# Patient Record
Sex: FEMALE | Race: BLACK OR AFRICAN AMERICAN | NOT HISPANIC OR LATINO | Employment: OTHER | RURAL
[De-identification: names, ages, dates, MRNs, and addresses within clinical notes are randomized per-mention and may not be internally consistent; named-entity substitution may affect disease eponyms.]

---

## 2021-12-09 ENCOUNTER — IMMUNIZATION (OUTPATIENT)
Dept: FAMILY MEDICINE | Facility: CLINIC | Age: 86
End: 2021-12-09
Payer: MEDICARE

## 2021-12-09 DIAGNOSIS — Z23 NEED FOR VACCINATION: Primary | ICD-10-CM

## 2021-12-09 PROCEDURE — 91306 COVID-19, MRNA, LNP-S, PF, 100 MCG/0.25 ML DOSE VACCINE (MODERNA BOOSTER): ICD-10-PCS | Mod: ,,, | Performed by: FAMILY MEDICINE

## 2021-12-09 PROCEDURE — 0064A COVID-19, MRNA, LNP-S, PF, 100 MCG/0.25 ML DOSE VACCINE (MODERNA BOOSTER): CPT | Mod: ,,, | Performed by: FAMILY MEDICINE

## 2021-12-09 PROCEDURE — 91306 COVID-19, MRNA, LNP-S, PF, 100 MCG/0.25 ML DOSE VACCINE (MODERNA BOOSTER): CPT | Mod: ,,, | Performed by: FAMILY MEDICINE

## 2021-12-09 PROCEDURE — 0064A COVID-19, MRNA, LNP-S, PF, 100 MCG/0.25 ML DOSE VACCINE (MODERNA BOOSTER): ICD-10-PCS | Mod: ,,, | Performed by: FAMILY MEDICINE

## 2024-09-04 DIAGNOSIS — M54.2 NECK PAIN ON LEFT SIDE: Primary | ICD-10-CM

## 2024-09-11 DIAGNOSIS — M54.12 CERVICAL RADICULOPATHY: Primary | ICD-10-CM

## 2024-09-27 ENCOUNTER — HOSPITAL ENCOUNTER (OUTPATIENT)
Dept: RADIOLOGY | Facility: HOSPITAL | Age: 89
Discharge: HOME | End: 2024-09-27
Attending: ORTHOPAEDIC SURGERY
Payer: MEDICARE

## 2024-09-27 ENCOUNTER — OFFICE VISIT (OUTPATIENT)
Dept: SPINE | Facility: CLINIC | Age: 89
End: 2024-09-27
Payer: MEDICARE

## 2024-09-27 DIAGNOSIS — M47.22 CERVICAL SPONDYLOSIS WITH RADICULOPATHY: Primary | ICD-10-CM

## 2024-09-27 DIAGNOSIS — M54.12 CERVICAL RADICULOPATHY: ICD-10-CM

## 2024-09-27 DIAGNOSIS — M54.2 NECK PAIN ON LEFT SIDE: ICD-10-CM

## 2024-09-27 PROCEDURE — 1159F MED LIST DOCD IN RCRD: CPT | Mod: CPTII,,, | Performed by: ORTHOPAEDIC SURGERY

## 2024-09-27 PROCEDURE — 72050 X-RAY EXAM NECK SPINE 4/5VWS: CPT | Mod: 26,,, | Performed by: ORTHOPAEDIC SURGERY

## 2024-09-27 PROCEDURE — 99214 OFFICE O/P EST MOD 30 MIN: CPT | Mod: PBBFAC,25 | Performed by: ORTHOPAEDIC SURGERY

## 2024-09-27 PROCEDURE — 72050 X-RAY EXAM NECK SPINE 4/5VWS: CPT | Mod: TC

## 2024-09-27 PROCEDURE — 99999 PR PBB SHADOW E&M-EST. PATIENT-LVL IV: CPT | Mod: PBBFAC,,, | Performed by: ORTHOPAEDIC SURGERY

## 2024-09-27 PROCEDURE — 99204 OFFICE O/P NEW MOD 45 MIN: CPT | Mod: S$PBB,,, | Performed by: ORTHOPAEDIC SURGERY

## 2024-09-27 RX ORDER — ESOMEPRAZOLE MAGNESIUM 40 MG/1
40 CAPSULE, DELAYED RELEASE ORAL
COMMUNITY

## 2024-09-27 RX ORDER — HYDROCHLOROTHIAZIDE 25 MG/1
25 TABLET ORAL
COMMUNITY
Start: 2024-07-28

## 2024-09-27 RX ORDER — ENALAPRIL MALEATE 20 MG/1
20 TABLET ORAL 2 TIMES DAILY
COMMUNITY
Start: 2024-07-09

## 2024-09-27 RX ORDER — GABAPENTIN 100 MG/1
100 CAPSULE ORAL
COMMUNITY
Start: 2024-08-23

## 2024-09-27 RX ORDER — ATORVASTATIN CALCIUM 40 MG/1
40 TABLET, FILM COATED ORAL
COMMUNITY
Start: 2024-02-27

## 2024-09-27 RX ORDER — CYCLOBENZAPRINE HCL 5 MG
5 TABLET ORAL
COMMUNITY
Start: 2024-08-23

## 2024-09-27 RX ORDER — MAGNESIUM 64 MG (MAGNESIUM CHLORIDE) TABLET,DELAYED RELEASE
64
COMMUNITY
Start: 2024-06-14

## 2024-09-27 RX ORDER — ASPIRIN 325 MG
50000 TABLET, DELAYED RELEASE (ENTERIC COATED) ORAL
COMMUNITY
Start: 2024-06-14

## 2024-09-27 RX ORDER — NAPROXEN SODIUM 220 MG/1
81 TABLET, FILM COATED ORAL
COMMUNITY

## 2024-09-27 RX ORDER — LORATADINE 10 MG/1
10 TABLET ORAL
COMMUNITY
Start: 2024-06-14

## 2024-09-27 RX ORDER — DOXAZOSIN 1 MG/1
1 TABLET ORAL
COMMUNITY
Start: 2024-08-24

## 2024-09-27 NOTE — PROGRESS NOTES
MDM/time:  45-50 minutes spent on this encounter including 15 minutes reviewing imaging and notes, 20 minutes with the patient, 10 minutes documentation    ASSESSMENT:  89 y.o. female with cervical spondylolisthesis at C7-T1    PLAN:  PT cervical spine   Follow up 4 months     HPI:  89 y.o. female here for evaluation of neck radiates into the left side of the neck no radicular pain in the arms or hands.  She reports May 20, 2024 she woke up and had a Crick in her neck in the left side and has had difficulty turning her head to the left since that time.  She denies numbness tingling in her arms or hands.  Denies difficulty with  strength.  Balance issues but no falls.  Denies bladder bowel incontinence.  No difficulty walking distances.  Currently taking Tylenol and muscle relaxers as needed for pain.  No recent physical therapy.  No prior pain management.  No recent MRI.  No prior spine surgery.  Patient is not a smoker.  Patient has had a CT scan of her cervical spine [facility]    IMAGING:  AP, lateral, flexion/extension views of the cervical spine reviewed     On the AP there is normal coronal alignment.  There is uncovertebral and facet hypertrophy seen.  On the lateral there is loss of cervical lordosis.  There are spondylotic changes noted with decrease in disc height and osteophyte formation seen.  Grade 1 anterolisthesis at C7-T1.     Impression:  Degenerative changes of cervical spine as noted above    No past medical history on file.  No past surgical history on file.      No current outpatient medications     EXAM:  Constitutional  General Appearance:  There is no height or weight on file to calculate BMI., NAD  Psychiatric   Orientation: Oriented to time, oriented to place, oriented to person  Mood and Affect: Active and alert, normal mood, normal affect  Gait and Station   Appearance:  antalgic gait, normal tandem gait, able to walk on toes, able to walk on  heels    CERVICAL  Musculoskeletal System  Shoulder:  normal appearance, no instability, no tenderness, normal ROM right, normal ROM left, no pain with ROM    Cervical Spine  Inspection:  alignment normal, no muscle atrophy  Soft Tissue Palpation on the Right:  no tenderness of the paracervicals, no tenderness of the trapezius, no tenderness of the rhomboid  Soft Tissue Palpation on the Left:  no tenderness of the paracervicals, no tenderness of the trapezius, no tenderness of the rhomboid  Bony Palpation:  no tenderness of the occipital protuberance  Active Range:     Flexion decreased, Extension decreased, Rotation to the left decreased, Rotation to the right decreased, Lateral flexion to the left decreased, Lateral flexion to the right decreased   Pain elicited on motion    Motor Strength  C5 on the right:  abduction deltoid 4/5  C5 on the left:  abduction deltoid 4/5  C6 on the Right:  flexion biceps 5/5, wrist extension 5/5  C6 on the Left:  flexion biceps 5/5, wrist extension 5/5  C7 on the Right:  extension fingers 5/5, extension triceps 5/5  C7 on the Left:  extension fingers 5/5, extension triceps 5/5  C8 on the Right:  flexion fingers 5/5  C8 on the Left:  flexion fingers 5/5  T1 on the Right:  abduction fingers 5/5  T1 on the Left:  abduction fingers 5/5    Neurological System  Sensation on the Right:  normal sensation of the extremities: right, normal median nerve distribution, normal ulnar nerve distribution  Sensation on the Left:  normal sensation of the extremities: left, normal median nerve distribution, normal ulnar nerve distribution  Biceps Reflex Right:  normal, Brachioradialis Reflex Right:  normal, Triceps Reflex Right: normal  Biceps Reflex Left:  normal, Brachioradialis Reflex Left: normal, Triceps Reflex Left:  normal  Special Test on the Right:  Spurlings test negative, Hoffmans reflex absent, no ankle clonus, Durkan test negative, Tinels sign negative at the elbow  Special Test on the  Left:  Spurlings test negative, Hoffmans reflex absent, no ankle clonus, Durkan test negative, Tinels sign negative at the elbow    Skin:   Head and Neck:  normal   Right Upper Extremity:  normal   Left Upper Extremity:  normal    Cardiovascular:   Arterial Pulses Right:  radial right   Arterial Pulses Left:  radial left   Edema Right:  none   Edema Left:  none

## 2024-10-22 ENCOUNTER — CLINICAL SUPPORT (OUTPATIENT)
Dept: REHABILITATION | Facility: HOSPITAL | Age: 89
End: 2024-10-22
Payer: MEDICARE

## 2024-10-22 DIAGNOSIS — M47.22 CERVICAL SPONDYLOSIS WITH RADICULOPATHY: ICD-10-CM

## 2024-10-22 PROCEDURE — 97162 PT EVAL MOD COMPLEX 30 MIN: CPT

## 2024-10-22 NOTE — PATIENT INSTRUCTIONS
Chin Tuck    Lying flat on back, keeping head in contact with bed or floor, tuck your chin without lifting your head. Hold and repeat. Repeat 10 Times   Hold 5 Seconds   Complete 2 Sets   Perform 2 Times a Day          UPPER TRAP STRETCH        - Stand upright with good posture, one arm behind the back  - Use your free hand to grab the top of your head  - Gently pull your head to the side (ear to shoulder)  - Hold the stretch and try to relax your muscles with deep breathing     Repeat 5 Times   Hold 20 Seconds   Perform 2 Times a Day          CERVICAL ROTATION WITH OVER PRESSURE STRETCH    Turn your head to one side and then use your opposite side hand to add over-pressure on your jaw for a deeper stretch.    Hold, relax and repeat.    Video # [id number] Repeat 10 Times   Hold 10 Seconds   Complete 1 Set   Perform 1 Times a Day          ELASTIC BAND ROWS    Tie the middle section of an elastic band in a knot and place it at elbow height on the other side of a door and shut the door on it.    Hold the elastic band with both hands and then pull the bands back as you allow your elbows to bend near the side of your body. Squeeze your shoulder blades down and together.    Return to starting position and repeat.    Video # [id number] Repeat 10 Times   Hold 1 Second   Complete 2 Sets   Perform 1 Times a Day          Corner Stretch    Stand upright in the corner of a wall, place hands on each wall in corner, stretch chest    Hold for 30 seconds for 3 sets Repeat 3 Times   Hold 30 Seconds   Complete 3 Sets

## 2024-10-22 NOTE — PLAN OF CARE
[facility]  Physical Therapy Initial Evaluation    Name: Randi Scanlon  Clinic Number: [id number]    Therapy Diagnosis:   Encounter Diagnosis   Name Primary?    Cervical spondylosis with radiculopathy        Physician: Magaly Spencer, NP    Physician Orders: PT Eval and Treat   Medical Diagnosis from Referral: M47.22 (ICD-10-CM) - Cervical spondylosis with radiculopathy  Evaluation Date: 10/22/2024  Authorization Period Expiration: Initial evaluation- Tracy  Plan of Care Expiration: 12/13/24  Visit # / Visits authorized: 1/ Only initial evaluation approved by [employer]. Subsequent visits require prior authorization.      Time In: 1056  Time Out: 1128  Total Appointment Time (timed & untimed codes): 32 minutes    Precautions: Standard, Diabetes, and heart palpitations, hx of cancer (not active)    Subjective     Date of onset: May of 2024    History of current condition - Randi reports: Patient states she had gradual onset of neck pain. Patient has left sided neck pain and she states she can hear something cracking in the neck. She states it is painful to turn her head either way. She denies any radiculopathy or numbness and tingling into the fingers.      Falls: none    Imaging: Per Medical Record (9/27/24)AP, lateral, flexion/extension views of the cervical spine reviewed     On the AP there is normal coronal alignment.  There is uncovertebral and facet hypertrophy seen.  On the lateral there is loss of cervical lordosis.  There are spondylotic changes noted with decrease in disc height and osteophyte formation seen.  Grade 1 anterolisthesis at C7-T1.     Impression:  Degenerative changes of cervical spine as noted above:     Prior Therapy: none  Social History: lives alone  Steps/Stairs: none  Occupation: housework  Driving: No, but was prior to onset of condition  Durable Medical Equipment: none  Dominant Extremity: right  Affected Extremity: left  Prior Level of  Function: ind  Current Level of Function: ind    Pain:  Current 5/10, worst 8/10, best 5/10   Location: left neck   Description: Dull and Sharp  Aggravating Factors: washing dishes or doing something around the house.  Easing Factors: nothing    Pts goals: be able to do more around the house without pain.     Medical History:   No past medical history on file.    Surgical History:   Randi Scanlon  has no past surgical history on file.    Medications:   Randi has a current medication list which includes the following prescription(s): aspirin, atorvastatin, cholecalciferol (vitamin d3), cyclobenzaprine, doxazosin, enalapril, esomeprazole, gabapentin, hydrochlorothiazide, loratadine, and mag 64.    Allergies:   Review of patient's allergies indicates:  No Known Allergies     Objective     Posture:   Forward head: yes  Thoracic curve: increased  Cervical curve: increased  Laterally flexed: right  Rotated: right  Rounded shoulders: yes  Scoliosis: no      Cervical range of motion:   AROM Pain/Dysfunction with Movement   Flexion 15    Extension 7    Right lateral flexion 9    Left lateral flexion 20    Right rotation 8    Left rotation 16        Upper Extremity manual muscle testing:    Right  Left    Shoulder flexion MMT strength: 4/5 MMT strength: 4/5   Shoulder abduction MMT shoulder: 4/5 MMT strength: 4/5   Shoulder IR MMT strength: 4/5 MMT strength: 4/5   Shoulder ER MMT strength: 4/5 MMT strength: 4/5   Elbow Flexion MMT strength: 4+/5 MMT strength: 4+/5   Elbow Extension MMT strength: 4+/5 MMT strength: 4+/5     Shoulder range of motion:    Right  Left    Flexion (180) 136 132   Internal rotation (45) T8 T9   External rotation (45) 55 35   Abduction (180) 122 112     Clinical Special Tests:   Compression test: Positive  Thoracic outlet: Negative  Distraction: Positive  Vertebral artery: right Negative; left Negative    Palpation:  TTP- bilateral upper traps, left sided cervical paraspinals, bilateral  "suboccipital muscles     Limitation/Restriction for FOTO Intake Survey    Therapist reviewed FOTO scores for Randi Scanlon on 10/22/2024.   FOTO documents entered into [employer] - see Media section.    Limitation Score: 40%       Patient Education and Home Exercises     Education provided: HEP provided 10/22/24    Written Home Exercises Provided: yes. Exercises were reviewed and Randi was able to demonstrate them prior to the end of the session. Randi demonstrated good understanding of the education provided.     See EMR under "Patient Instructions" for exercises provided during therapy sessions.    Assessment     Randi is a 89 y.o. female referred to outpatient physical therapy with a medical diagnosis of M47.22 (ICD-10-CM) - Cervical spondylosis with radiculopathy. Pt presents to PT with left sided neck pain, cervical hypomobility, postural muscle imbalance, decreased upper extremity function.    Pt prognosis is Good.   Pt will benefit from skilled outpatient Physical Therapy to address the deficits stated above and in the chart below, provide pt/family education, and to maximize pt's level of independence.     Plan of care discussed with patient: Yes  Pt's spiritual, cultural and educational needs considered and pt agreeable to plan of care and goals as stated below:     Anticipated Barriers for therapy: chronicity of condition    Medical Necessity is demonstrated by the following:  History  Co-morbidities and personal factors that may impact the plan of care [] LOW: no personal factors / co-morbidities  [x] MODERATE: 1-2 personal factors / co-morbidities  [] HIGH: 3+ personal factors / co-morbidities    Moderate / High Support Documentation:   Co-morbidities affecting plan of care: diabetic    Personal Factors:   no deficits     Examination  Body Structures and Functions, activity limitations and participation restrictions that may impact the plan of care [] LOW: addressing 1-2 elements  [x] MODERATE: 3+ " elements  [] HIGH: 4+ elements (please support below)    Moderate / High Support Documentation: pain, decreased rom, decreased ue function     Clinical Presentation [] LOW: stable  [x] MODERATE: Evolving  [] HIGH: Unstable     Decision Making/ Complexity Score: moderate       Goals:    Short Term Goals:  Patient will demonstrate independence with home exercise program to ensure carryover of treatment.  Patient will demonstrate 60 degrees of cervical flexion active range of motion to improve functional use of the cervical spine.  Patient will demonstrate 60 degrees of cervical extension active range of motion to improve functional use of the cervical spine.  Patient will demonstrate 50 degrees of bilateral cervical lateral flexion active range of motion to improve functional use of the cervical spine.  Patient will demonstrate 50 degrees of bilateral cervical rotation active range of motion to improve functional use of the cervical spine.    Patient will report a reduction in cervical pain from 8/10 to 5/10 at worst to improve quality of life.     Long Term Goals:  Patient will demonstrate improved cervicothoracic posture in sitting/standing without verbal/visual cues to improve posture and reduce risk for further injury.  Patient will report a reduction in cervical pain from 8/10 to 3/10 at worst to improve quality of life.      Plan     Plan of care Certification: 10/22/2024 to 12/13/24.    Outpatient Physical Therapy 2 times weekly for 6 weeks to include the following interventions: 11405 [therapeutic exercise], 69955 [neuromuscular re-education], 13418 [manual therapy], and 88243 [therapeutic activities].    Case conference performed with Timur Perez PTA and Sarah Suarez PTA to discuss evaluation findings, goals, and plan of care.   Clinical Information for Insurance Authorization     Dates of Services: 10/22/24 to 12/13/24  Discharge Plan: Patient will be discharged from skilled physical therapy treatment  once all goals have been met, patient has plateaued, or physician/insurance requests discontinuation of care. Patient will be discharged with a home exercise program.   Type of therapy: Rehabilitative  ICD-10 Diagnosis Code(s): M47.22 (ICD-10-CM) - Cervical spondylosis with radiculopathy  Which side is symptomatic? Left  Surgical: No  Surgical procedure: N/A  Surgery date: N/A.  Presenting symptoms/diagnoses are unspecified in nature.  Presenting symptoms are non-radiating in nature.   The rehabilitation is not related to a diagnosis of cancer.  The rehabilitation is not related to a diagnosis of lymphedema.  Patient's clinical presentation is:  Moderate objective and functional deficits: consistent symptoms and/or symptoms that are intensified with activity with moderate loss of range of motion, strength, or ability to perform daily tasks  CPT Codes Requested:  69863 [therapeutic exercise], 60862 [neuromuscular re-education], 19105 [manual therapy], and 67560 [therapeutic activities]      Matthew Ibrahim PT, DPT  10/22/2024      Physician's Signature: _________________________________________  Date: __________________________

## 2024-10-24 ENCOUNTER — CLINICAL SUPPORT (OUTPATIENT)
Dept: REHABILITATION | Facility: HOSPITAL | Age: 89
End: 2024-10-24
Payer: MEDICARE

## 2024-10-24 DIAGNOSIS — Z74.09 IMPAIRED FUNCTIONAL MOBILITY AND ACTIVITY TOLERANCE: ICD-10-CM

## 2024-10-24 DIAGNOSIS — M54.2 NECK PAIN: Primary | ICD-10-CM

## 2024-10-24 PROCEDURE — 97112 NEUROMUSCULAR REEDUCATION: CPT | Mod: CQ

## 2024-10-24 PROCEDURE — 97140 MANUAL THERAPY 1/> REGIONS: CPT | Mod: CQ

## 2024-10-24 PROCEDURE — 97110 THERAPEUTIC EXERCISES: CPT | Mod: CQ

## 2024-10-24 NOTE — PROGRESS NOTES
"[facility]  Physical Therapy Treatment Note    Name: Randi Scanlon  Clinic Number: [id number]    Therapy Diagnosis: No diagnosis found.  Physician: Magaly Spencer NP    Visit Date: 10/24/2024    Physician Orders: PT Eval and Treat   Medical Diagnosis from Referral: M47.22 (ICD-10-CM) - Cervical spondylosis with radiculopathy  Evaluation Date: 10/22/2024  Authorization Period Expiration: 12/13/24  Plan of Care Expiration: 12/13/24  Visit # / Visits authorized: 1/12   PTA Visit #: 1    Time In: ***  Time Out: ***  Total Billable Time: *** minutes    Precautions: Standard, Diabetes, and heart palpitations, hx of cancer (not active)    Subjective     Pt reports: ***.    She {Actions; was/was not:82320} compliant with home exercise program.    Pain: {0-10:19123::"0"}/10  Location: left neck      Objective     Randi received therapeutic exercises to develop strength, endurance, ROM, flexibility, and posture for *** minutes including:    UBE: x 5 minutes  Corner stretch: 3 reps x 20 sec hold  Upper trap stretch: 3 reps x 20 sec hold each  Levator scapulae stretch: 3 reps x 20 sec hold each  Cervical rotation stretch with towel: 3 reps x 20 sec hold each  Corner stretch: 3 x 30 sec hold    Randi received the following manual therapy techniques: were applied to the: cervical for *** minutes, including:    Myofascial release to surrounding cervical musculature including bilateral upper to mid trapezius with intermittent manual traction    Randi participated in neuromuscular re-education activities to improve: Posture for *** minutes. The following activities were included:    Scapular retraction: x 20 reps x 3 sec hold  Supine chin tucks: x 15 reps x 3 sec hold  Shoulder rows with scapular retraction: red theraband; x 20 reps  Shoulder extensions with scapular retraction: red theraband; x 20 reps  Bilateral external rotation with red theraband: x 20 reps; verbal cues for correct " posture    Randi participated in dynamic functional therapeutic activities to improve functional performance for 0 minutes, including:  Not performed    Randi received hot pack for *** minutes to ***.      Home Exercises Provided and Patient Education Provided     Education provided: Patient educated on the importance of completing skilled physical therapy treatment and home exercise program as prescribed to maximize functional gains.    Written Home Exercises Provided: Patient instructed to cont prior HEP. Exercises were reviewed and Randi was able to demonstrate them prior to the end of the session.  Randi demonstrated good  understanding of the education provided.     See EMR under Patient Instructions for exercises provided  during therapy sessions .    Assessment     ***    Randi {Is/is not:9024} progressing well towards her goals.   Pt prognosis is Good.     Pt will continue to benefit from skilled outpatient physical therapy to address the deficits listed in the problem list box on initial evaluation, provide pt/family education, and to maximize pt's level of independence in the home and community environment.     Pt's spiritual, cultural, and educational needs considered and pt agreeable to plan of care and goals.     Anticipated barriers to physical therapy: chronicity of condition    Goals:    Short Term Goals:  Patient will demonstrate independence with home exercise program to ensure carryover of treatment.  Patient will demonstrate 60 degrees of cervical flexion active range of motion to improve functional use of the cervical spine.  Patient will demonstrate 60 degrees of cervical extension active range of motion to improve functional use of the cervical spine.  Patient will demonstrate 50 degrees of bilateral cervical lateral flexion active range of motion to improve functional use of the cervical spine.  Patient will demonstrate 50 degrees of bilateral cervical rotation active range of motion to  improve functional use of the cervical spine.    Patient will report a reduction in cervical pain from 8/10 to 5/10 at worst to improve quality of life.      Long Term Goals:  Patient will demonstrate improved cervicothoracic posture in sitting/standing without verbal/visual cues to improve posture and reduce risk for further injury.  Patient will report a reduction in cervical pain from 8/10 to 3/10 at worst to improve quality of life.      Plan     Patient will continue to benefit from skilled physical therapy treatment as prescribed working towards goals listed above to maximize functional potential.       Timur Perez, MARJ  10/24/2024

## 2024-10-24 NOTE — PROGRESS NOTES
[facility]  Physical Therapy Treatment Note    Name: Randi Scanlon  Clinic Number: [id number]    Therapy Diagnosis:   Encounter Diagnoses   Name Primary?    Neck pain Yes    Impaired functional mobility and activity tolerance      Physician: Magaly Spencer NP    Visit Date: 10/24/2024    Physician Orders: PT Eval and Treat   Medical Diagnosis from Referral: M47.22 (ICD-10-CM) - Cervical spondylosis with radiculopathy  Evaluation Date: 10/22/2024  Authorization Period Expiration: 12/13/24  Plan of Care Expiration: 12/13/24  Visit # / Visits authorized: 1/12   PTA Visit #: 1    Time In: 1101  Time Out: 1141  Total Billable Time: 40 minutes    Precautions: Standard, Diabetes, and heart palpitations, hx of cancer (not active)    Subjective     Pt reports: her neck is bothering her .    She  was somewhat  compliant with home exercise program.    Pain: 5/10  Location: left neck      Objective     Randi received therapeutic exercises to develop strength, endurance, ROM, flexibility, and posture for 15 minutes including:    Retro UBE: x 5 minutes  Corner stretch: 3 reps x 20 sec hold  Upper trap stretch: 3 reps x 20 sec hold each  Levator scapulae stretch: 3 reps x 20 sec hold each  Cervical rotation stretch with towel: 3 reps x 20 sec hold each    Randi received the following manual therapy techniques: were applied to the: cervical for 10 minutes, including:    Myofascial release to surrounding cervical musculature including bilateral upper to mid trapezius with intermittent manual traction    Randi participated in neuromuscular re-education activities to improve: Posture for 15 minutes. The following activities were included:    Scapular retraction: x 20 reps x 3 sec hold  Supine chin tucks: x 15 reps x 3 sec hold  Shoulder rows with scapular retraction: red theraband; x 20 reps  Shoulder extensions with scapular retraction: red theraband; x 20 reps  Bilateral external  rotation with red theraband: x 20 reps; verbal cues for correct posture    Randi participated in dynamic functional therapeutic activities to improve functional performance for 0 minutes, including:  Not performed    Randi received hot pack for 0 minutes to cervical.      Home Exercises Provided and Patient Education Provided     Education provided: Patient educated on the importance of completing skilled physical therapy treatment and home exercise program as prescribed to maximize functional gains.    Written Home Exercises Provided: Patient instructed to cont prior HEP. Exercises were reviewed and Randi was able to demonstrate them prior to the end of the session.  Randi demonstrated good  understanding of the education provided.     See EMR under Patient Instructions for exercises provided  during therapy sessions .    Assessment     Patient with increased pain to 5/10 upon arrival. Patient with good effort throughout treatment. Patient able to perform all interventions with good tolerance and no complaints of increase in pain. Patient with good tolerance of manual therapy to cervical stating she felt like it helped, but she was still having pain and stiffness post treatment. Physical Therapist Assistant will continue to progress therapeutic exercise, neuromuscular re-education, therapeutic activities, and with manual therapy as needed.       Randi is progressing well towards her goals.   Pt prognosis is Good.     Pt will continue to benefit from skilled outpatient physical therapy to address the deficits listed in the problem list box on initial evaluation, provide pt/family education, and to maximize pt's level of independence in the home and community environment.     Pt's spiritual, cultural, and educational needs considered and pt agreeable to plan of care and goals.     Anticipated barriers to physical therapy: chronicity of condition    Goals:    Short Term Goals:  Patient will demonstrate  independence with home exercise program to ensure carryover of treatment.  Patient will demonstrate 60 degrees of cervical flexion active range of motion to improve functional use of the cervical spine.  Patient will demonstrate 60 degrees of cervical extension active range of motion to improve functional use of the cervical spine.  Patient will demonstrate 50 degrees of bilateral cervical lateral flexion active range of motion to improve functional use of the cervical spine.  Patient will demonstrate 50 degrees of bilateral cervical rotation active range of motion to improve functional use of the cervical spine.    Patient will report a reduction in cervical pain from 8/10 to 5/10 at worst to improve quality of life.      Long Term Goals:  Patient will demonstrate improved cervicothoracic posture in sitting/standing without verbal/visual cues to improve posture and reduce risk for further injury.  Patient will report a reduction in cervical pain from 8/10 to 3/10 at worst to improve quality of life.      Plan     Patient will continue to benefit from skilled physical therapy treatment as prescribed working towards goals listed above to maximize functional potential.       Timur Perez, MARJ  10/24/2024

## 2024-10-30 ENCOUNTER — CLINICAL SUPPORT (OUTPATIENT)
Dept: REHABILITATION | Facility: HOSPITAL | Age: 89
End: 2024-10-30
Payer: MEDICARE

## 2024-10-30 DIAGNOSIS — M54.2 NECK PAIN: Primary | ICD-10-CM

## 2024-10-30 DIAGNOSIS — Z74.09 IMPAIRED FUNCTIONAL MOBILITY AND ACTIVITY TOLERANCE: ICD-10-CM

## 2024-10-30 PROCEDURE — 97112 NEUROMUSCULAR REEDUCATION: CPT | Mod: CQ

## 2024-10-30 PROCEDURE — 97110 THERAPEUTIC EXERCISES: CPT | Mod: CQ

## 2024-10-30 PROCEDURE — 97140 MANUAL THERAPY 1/> REGIONS: CPT | Mod: CQ

## 2024-11-04 ENCOUNTER — CLINICAL SUPPORT (OUTPATIENT)
Dept: REHABILITATION | Facility: HOSPITAL | Age: 89
End: 2024-11-04
Payer: MEDICARE

## 2024-11-04 DIAGNOSIS — Z74.09 IMPAIRED FUNCTIONAL MOBILITY AND ACTIVITY TOLERANCE: ICD-10-CM

## 2024-11-04 DIAGNOSIS — M54.2 NECK PAIN: Primary | ICD-10-CM

## 2024-11-04 PROCEDURE — 97112 NEUROMUSCULAR REEDUCATION: CPT

## 2024-11-04 PROCEDURE — 97140 MANUAL THERAPY 1/> REGIONS: CPT

## 2024-11-04 PROCEDURE — 97110 THERAPEUTIC EXERCISES: CPT

## 2024-11-04 NOTE — PROGRESS NOTES
[facility]  Physical Therapy Treatment Note    Name: Randi Scanlon  Clinic Number: [id number]    Therapy Diagnosis:   Encounter Diagnoses   Name Primary?    Neck pain Yes    Impaired functional mobility and activity tolerance        Physician: Magaly Spencer NP    Visit Date: 11/4/2024    Physician Orders: PT Eval and Treat   Medical Diagnosis from Referral: M47.22 (ICD-10-CM) - Cervical spondylosis with radiculopathy  Evaluation Date: 10/22/2024  Authorization Period Expiration: 12/13/24  Plan of Care Expiration: 12/13/24  Visit # / Visits authorized: 3/12   PTA Visit #: 0    Time In: 1103 (patient's treatment started before patient was checked in)   Time Out: 1142  Total Billable Time: 39 minutes    Precautions: Standard, Diabetes, and heart palpitations, hx of cancer (not active)    Subjective     Pt reports: her neck got to hurting last night and she couldn't sleep.     She  was somewhat  compliant with home exercise program.    Pain: 5/10  Location: left neck      Objective     Randi received therapeutic exercises to develop strength, endurance, ROM, flexibility, and posture for 17 minutes including:  Retro UBE: x 5 minutes  Corner stretch: 3 reps x 20 sec hold  Upper trap stretch: 3 reps x 20 sec hold each  Levator scapulae stretch: 3 reps x 20 sec hold each  Cervical rotation stretch with towel: 3 reps x 20 sec hold each    Randi received the following manual therapy techniques: were applied to the: cervical for 10 minutes, including:  Myofascial release to surrounding cervical musculature including bilateral upper to mid trapezius with intermittent manual traction    Randi participated in neuromuscular re-education activities to improve: Posture for 12 minutes. The following activities were included:  Scapular retraction: x 20 reps x 3 sec hold  Supine chin tucks: x 15 reps x 3 sec hold  Shoulder rows with scapular retraction: red theraband; x 20 reps  Shoulder  extensions with scapular retraction: red theraband; x 20 reps  Bilateral external rotation with red theraband: x 20 reps; verbal cues for correct posture    Randi participated in dynamic functional therapeutic activities to improve functional performance for 0 minutes, including:  Not performed    Randi received hot pack for 0 minutes to cervical.    Home Exercises Provided and Patient Education Provided     Education provided: Patient educated on the importance of completing skilled physical therapy treatment and home exercise program as prescribed to maximize functional gains.    Written Home Exercises Provided: Patient instructed to cont prior HEP. Exercises were reviewed and Randi was able to demonstrate them prior to the end of the session.  Randi demonstrated good  understanding of the education provided.     See EMR under Patient Instructions for exercises provided  during therapy sessions .    Assessment     Patient with good effort throughout treatment. Patient able to perform all interventions with good tolerance and no complaints of increase in pain. Patient with good tolerance of manual therapy to cervical stating she felt like it helped. Will continue to progress therapeutic exercise, neuromuscular re-education, therapeutic activities, and with manual therapy as needed.       Randi is progressing well towards her goals.   Pt prognosis is Good.     Pt will continue to benefit from skilled outpatient physical therapy to address the deficits listed in the problem list box on initial evaluation, provide pt/family education, and to maximize pt's level of independence in the home and community environment.     Pt's spiritual, cultural, and educational needs considered and pt agreeable to plan of care and goals.     Anticipated barriers to physical therapy: chronicity of condition    Goals:    Short Term Goals:  Patient will demonstrate independence with home exercise program to ensure carryover of  treatment.  Patient will demonstrate 60 degrees of cervical flexion active range of motion to improve functional use of the cervical spine.  Patient will demonstrate 60 degrees of cervical extension active range of motion to improve functional use of the cervical spine.  Patient will demonstrate 50 degrees of bilateral cervical lateral flexion active range of motion to improve functional use of the cervical spine.  Patient will demonstrate 50 degrees of bilateral cervical rotation active range of motion to improve functional use of the cervical spine.    Patient will report a reduction in cervical pain from 8/10 to 5/10 at worst to improve quality of life.      Long Term Goals:  Patient will demonstrate improved cervicothoracic posture in sitting/standing without verbal/visual cues to improve posture and reduce risk for further injury.  Patient will report a reduction in cervical pain from 8/10 to 3/10 at worst to improve quality of life.      Plan     Patient will continue to benefit from skilled physical therapy treatment as prescribed working towards goals listed above to maximize functional potential.       Matthew Ibrahim, PT, DPT  11/4/2024

## 2024-11-06 ENCOUNTER — CLINICAL SUPPORT (OUTPATIENT)
Dept: REHABILITATION | Facility: HOSPITAL | Age: 89
End: 2024-11-06
Payer: MEDICARE

## 2024-11-06 DIAGNOSIS — M54.2 NECK PAIN: Primary | ICD-10-CM

## 2024-11-06 DIAGNOSIS — Z74.09 IMPAIRED FUNCTIONAL MOBILITY AND ACTIVITY TOLERANCE: ICD-10-CM

## 2024-11-06 PROCEDURE — 97112 NEUROMUSCULAR REEDUCATION: CPT | Mod: CQ

## 2024-11-06 PROCEDURE — 97110 THERAPEUTIC EXERCISES: CPT | Mod: CQ

## 2024-11-06 NOTE — PROGRESS NOTES
"[facility]  Physical Therapy Treatment Note    Name: Randi Scanlon  Clinic Number: [id number]    Therapy Diagnosis:   Encounter Diagnoses   Name Primary?    Neck pain Yes    Impaired functional mobility and activity tolerance        Physician: Magaly Spencer NP    Visit Date: 11/6/2024    Physician Orders: PT Eval and Treat   Medical Diagnosis from Referral: M47.22 (ICD-10-CM) - Cervical spondylosis with radiculopathy  Evaluation Date: 10/22/2024  Authorization Period Expiration: 12/13/24  Plan of Care Expiration: 12/13/24  Visit # / Visits authorized: 4/12   PTA Visit #: 1    Time In: 1101  Time Out: 1136  Total Billable Time: 35 minutes    Precautions: Standard, Diabetes, and heart palpitations, hx of cancer (not active)    Subjective     Pt reports: her neck is doing well today stating "it is not hurting right now"    She  was somewhat  compliant with home exercise program.    Pain: 0/10  Location: left neck      Objective     Randi received therapeutic exercises to develop strength, endurance, ROM, flexibility, and posture for 15 minutes including:    Retro UBE: x 5 minutes  Corner stretch: 3 reps x 20 sec hold  Upper trap stretch: 3 reps x 20 sec hold each  Levator scapulae stretch: 3 reps x 20 sec hold each  Cervical rotation stretch: 3 reps x 20 sec hold each    Randi received the following manual therapy techniques: were applied to the: cervical for 8 minutes, including:  Myofascial release to surrounding cervical musculature including bilateral upper to mid trapezius with intermittent manual traction    Randi participated in neuromuscular re-education activities to improve: Posture for 12 minutes. The following activities were included:    Scapular retraction: x 20 reps x 3 sec hold  Supine chin tucks: x 15 reps x 3 sec hold  Shoulder rows with scapular retraction: red theraband; x 20 reps  Shoulder extensions with scapular retraction: red theraband; x 20 " reps  Bilateral external rotation with red theraband: x 20 reps; verbal cues for correct posture    Randi participated in dynamic functional therapeutic activities to improve functional performance for 0 minutes, including:  Not performed    Randi received hot pack for 0 minutes to cervical.    Home Exercises Provided and Patient Education Provided     Education provided: Patient educated on the importance of completing skilled physical therapy treatment and home exercise program as prescribed to maximize functional gains.    Written Home Exercises Provided: Patient instructed to cont prior HEP. Exercises were reviewed and Randi was able to demonstrate them prior to the end of the session.  Randi demonstrated good  understanding of the education provided.     See EMR under Patient Instructions for exercises provided  during therapy sessions .    Assessment     Patient with good effort throughout treatment. Patient able to complete all exercises and stretches with good tolerance and no complaints of increase in pain. Patient with good tolerance of manual therapy to cervical today. Will continue to progress therapeutic exercise, neuromuscular re-education, therapeutic activities, and with manual therapy as needed.       Randi is progressing well towards her goals.   Pt prognosis is Good.     Pt will continue to benefit from skilled outpatient physical therapy to address the deficits listed in the problem list box on initial evaluation, provide pt/family education, and to maximize pt's level of independence in the home and community environment.     Pt's spiritual, cultural, and educational needs considered and pt agreeable to plan of care and goals.     Anticipated barriers to physical therapy: chronicity of condition    Goals:    Short Term Goals:  Patient will demonstrate independence with home exercise program to ensure carryover of treatment.  Patient will demonstrate 60 degrees of cervical flexion active  range of motion to improve functional use of the cervical spine.  Patient will demonstrate 60 degrees of cervical extension active range of motion to improve functional use of the cervical spine.  Patient will demonstrate 50 degrees of bilateral cervical lateral flexion active range of motion to improve functional use of the cervical spine.  Patient will demonstrate 50 degrees of bilateral cervical rotation active range of motion to improve functional use of the cervical spine.    Patient will report a reduction in cervical pain from 8/10 to 5/10 at worst to improve quality of life.      Long Term Goals:  Patient will demonstrate improved cervicothoracic posture in sitting/standing without verbal/visual cues to improve posture and reduce risk for further injury.  Patient will report a reduction in cervical pain from 8/10 to 3/10 at worst to improve quality of life.      Plan     Patient will continue to benefit from skilled physical therapy treatment as prescribed working towards goals listed above to maximize functional potential.       Timur Perez, MARJ  11/6/2024

## 2024-12-12 ENCOUNTER — TELEPHONE (OUTPATIENT)
Dept: SPINE | Facility: CLINIC | Age: 89
End: 2024-12-12
Payer: MEDICARE